# Patient Record
Sex: FEMALE | Race: WHITE | NOT HISPANIC OR LATINO | Employment: OTHER | URBAN - METROPOLITAN AREA
[De-identification: names, ages, dates, MRNs, and addresses within clinical notes are randomized per-mention and may not be internally consistent; named-entity substitution may affect disease eponyms.]

---

## 2018-08-31 RX ORDER — LEVOTHYROXINE SODIUM 112 UG/1
TABLET ORAL
Qty: 90 TABLET | Refills: 3 | Status: SHIPPED | OUTPATIENT
Start: 2018-08-31 | End: 2022-08-31

## 2020-05-21 ENCOUNTER — OFFICE VISIT (OUTPATIENT)
Dept: SURGERY | Facility: CLINIC | Age: 45
End: 2020-05-21
Payer: COMMERCIAL

## 2020-05-21 DIAGNOSIS — Z80.0 FAMILY HISTORY OF COLON CANCER: Primary | ICD-10-CM

## 2020-05-21 PROCEDURE — 99203 PR OFFICE/OUTPT VISIT, NEW, LEVL III, 30-44 MIN: ICD-10-PCS | Mod: 95,,, | Performed by: SURGERY

## 2020-05-21 PROCEDURE — 99203 OFFICE O/P NEW LOW 30 MIN: CPT | Mod: 95,,, | Performed by: SURGERY

## 2020-05-21 RX ORDER — SODIUM CHLORIDE 9 MG/ML
INJECTION, SOLUTION INTRAVENOUS CONTINUOUS
Status: CANCELLED | OUTPATIENT
Start: 2020-05-21

## 2020-05-21 RX ORDER — LIDOCAINE HYDROCHLORIDE 10 MG/ML
1 INJECTION, SOLUTION EPIDURAL; INFILTRATION; INTRACAUDAL; PERINEURAL ONCE
Status: DISCONTINUED | OUTPATIENT
Start: 2020-05-21 | End: 2020-06-29 | Stop reason: HOSPADM

## 2020-05-21 NOTE — H&P
"The patient location is: Home  The chief complaint leading to consultation is: I need a colonoscopy    Visit type: audiovisual    Face to Face time with patient: 30  30 minutes of total time spent on the encounter, which includes face to face time and non-face to face time preparing to see the patient (eg, review of tests), Obtaining and/or reviewing separately obtained history, Documenting clinical information in the electronic or other health record, Independently interpreting results (not separately reported) and communicating results to the patient/family/caregiver, or Care coordination (not separately reported).     Each patient to whom he or she provides medical services by telemedicine is:  (1) informed of the relationship between the physician and patient and the respective role of any other health care provider with respect to management of the patient; and (2) notified that he or she may decline to receive medical services by telemedicine and may withdraw from such care at any time.    Notes:       [facility] Surgery H&P Note    Subjective:       Patient ID: Kimberly Diane is a 45 y.o. female.    Chief Complaint: "I need a colonoscopy"    HPI:  Kimberly Diane is a 45 y.o. female history of thyroid disease status post thyroidectomy and currently on daily thyroid replacement presents today as a new patient referral for evaluation of a new family history of colon cancer.  Patient stated that her mother recently  of colon cancer at the age of 72.  It was well advanced at the time of diagnosis in within 2-3 months after diagnosis she succumbed to the colon cancer.  The physicians taking care of her mother notified the family that they would need to be screened for colon cancer given now either high risk nature.  Patient has had no blood in the stool.  No unexplained weight loss.  No bowel habit changes.  She presented to her primary care who recommended her here for evaluation possible " colonoscopy.    Past Medical History:   Diagnosis Date    Thyroid disease      Past Surgical History:   Procedure Laterality Date    CHOLECYSTECTOMY  2003    THYROIDECTOMY  2001     Family History   Problem Relation Age of Onset    Diabetes Mother     Cervical cancer Mother     Colon cancer Mother      Social History     Socioeconomic History    Marital status:      Spouse name: Not on file    Number of children: Not on file    Years of education: Not on file    Highest education level: Not on file   Occupational History    Not on file   Social Needs    Financial resource strain: Not on file    Food insecurity:     Worry: Not on file     Inability: Not on file    Transportation needs:     Medical: Not on file     Non-medical: Not on file   Tobacco Use    Smoking status: Never Smoker    Smokeless tobacco: Never Used   Substance and Sexual Activity    Alcohol use: Yes     Comment: occaisional    Drug use: No    Sexual activity: Not on file   Lifestyle    Physical activity:     Days per week: Not on file     Minutes per session: Not on file    Stress: Not on file   Relationships    Social connections:     Talks on phone: Not on file     Gets together: Not on file     Attends [employer] service: Not on file     Active member of club or organization: Not on file     Attends meetings of clubs or organizations: Not on file     Relationship status: Not on file   Other Topics Concern    Not on file   Social History Narrative    Not on file       Current Outpatient Medications   Medication Sig Dispense Refill    GILDESS FE 1/20, 28, 1 mg-20 mcg (21)/75 mg (7) per tablet Take 1 tablet by mouth once daily.  0    levothyroxine (SYNTHROID) 112 MCG tablet TAKE ONE TABLET BY MOUTH ONCE DAILY 90 tablet 3    levothyroxine (SYNTHROID) 125 MCG tablet Take 1 tablet by mouth once daily.  0    NORETHINDRONE-E.ESTRADIOL-IRON (GILDESS 24 FE ORAL) Take by mouth.       Current Facility-Administered  Medications   Medication Dose Route Frequency Provider Last Rate Last Dose    lidocaine (PF) 10 mg/ml (1%) injection 10 mg  1 mL Intradermal Once Jarad Sorensen MD         Review of patient's allergies indicates:  No Known Allergies    Review of Systems   Constitutional: Negative for activity change, appetite change, chills and fever.   HENT: Negative for congestion, dental problem and ear discharge.    Eyes: Negative for discharge and itching.   Respiratory: Negative for apnea, choking and chest tightness.    Cardiovascular: Negative for chest pain and leg swelling.   Gastrointestinal: Negative for abdominal distention, abdominal pain, anal bleeding, constipation, diarrhea and nausea.   Endocrine: Negative for cold intolerance and heat intolerance.   Genitourinary: Negative for difficulty urinating and dyspareunia.   Musculoskeletal: Negative for arthralgias and back pain.   Skin: Negative for color change and pallor.   Neurological: Negative for dizziness and facial asymmetry.   Hematological: Negative for adenopathy. Does not bruise/bleed easily.   Psychiatric/Behavioral: Negative for agitation and behavioral problems.       Objective:      There were no vitals filed for this visit.  Physical Exam   Constitutional: She is oriented to person, place, and time. She appears well-developed and well-nourished. No distress.   HENT:   Head: Normocephalic and atraumatic.   Eyes: Pupils are equal, round, and reactive to light. EOM are normal.   Neck: Normal range of motion. Neck supple. No thyromegaly present.   Cardiovascular: Normal rate and regular rhythm.   Pulmonary/Chest: Effort normal and breath sounds normal.   Abdominal: Soft. Bowel sounds are normal. She exhibits no distension. There is no tenderness.   Musculoskeletal: Normal range of motion. She exhibits no edema or deformity.   Neurological: She is alert and oriented to person, place, and time. No cranial nerve deficit.   Skin: Skin is warm. Capillary  refill takes less than 2 seconds. No erythema.   Psychiatric: She has a normal mood and affect. Her behavior is normal.        Assessment:       1. Family history of colon cancer        Plan:   Family history of colon cancer  -     CBC auto differential; Future; Expected date: 08/21/2020  -     Comprehensive metabolic panel; Future; Expected date: 08/21/2020  -     COVID-19 Routine Screening; Future; Expected date: 05/21/2020  -     Case Request Operating Room: COLONOSCOPY  -     Insert peripheral IV; Standing  -     Basic metabolic panel; Future; Expected date: 05/21/2020  -     CBC auto differential; Future; Expected date: 05/21/2020  -     Full code; Standing    Other orders  -     Progressive Mobility Protocol (mobilize patient to their highest level of functioning at least twice daily); Standing  -     lidocaine (PF) 10 mg/ml (1%) injection 10 mg        Medical Decision Making/Counseling:  Patient with family history of colon cancer.  Advanced at diagnosis.  No history of colonoscopy.  Patient now high risk for colon cancer given her family history.  Patient desires to proceed with colonoscopy.  Risk benefits colonoscopy were discussed in detail in clinic today.  After risk benefits discussion, patient voiced understanding risk benefits colonoscopy wished proceed the near future.    Will obtain preoperative lab test to include CBC, CMP and EKG for review by the Anesthesiologist the day of the procedure prior to induction of the anesthetic agent of choice.    Risks and benefits of endoscopy were discussed in depth in clinic.  From a procedural standpoint, we discuss the benefits of colonoscopy to be finding colon cancers at early stages, including polyps which can be endoscopically resectable, to finding early stage colon cancers which can be better treated with current medical and surgical therapies in order to give patients a longer survival, if found in these early stages.  From a standpoint of risks, the  risk of bleeding and perforation of the colon were discussed.  I personally discussed that if complications of bleeding or perforation were to occur, the patient could need as little as a blood transfusion and as much as possible hospital admission, repeat procedure, or even surgery. Total clinic time spent today 30 minutes with greater than half of the time spent in face to face counseling.    Patient instructed that best way to communicate with my office staff is for patient to get on the [facility] epic patient portal to expedite communication and communication issues that may occur.  Patient was given instructions on how to get on the portal.  I encouraged patient to obtain portal access as well.  Ultimately it is up to the patient to obtain access.  Patient voiced understanding.

## 2020-05-21 NOTE — LETTER
May 21, 2020      Rogelio Saba MD  [address] [place] [address]           [facility] - [facility]  [address] [place] [address]  Phone: [phone]  Fax: [phone]          Patient: Kimberly Diane   MR Number: [id number]   YOB: 1975   Date of Visit: 5/21/2020       Dear Dr. Rogelio Saba:    Thank you for referring Kimberly Diane to me for evaluation. Attached you will find relevant portions of my assessment and plan of care.    If you have questions, please do not hesitate to call me. I look forward to following Kimberly Diane along with you.    Sincerely,    Jarad Sorensen MD    Enclosure  CC:  No Recipients    If you would like to receive this communication electronically, please contact [email or web address] or [phone] to request more information on [employer] access.    For providers and/or their staff who would like to refer a patient to [facility], please contact us through our one-stop-shop provider referral line, [facility] , at [phone].    If you feel you have received this communication in error or would no longer like to receive these types of communications, please e-mail [email or web address]

## 2020-06-17 ENCOUNTER — LAB VISIT (OUTPATIENT)
Dept: LAB | Facility: HOSPITAL | Age: 45
End: 2020-06-17
Attending: SURGERY
Payer: COMMERCIAL

## 2020-06-17 DIAGNOSIS — Z80.0 FAMILY HISTORY OF COLON CANCER: ICD-10-CM

## 2020-06-17 LAB
ALBUMIN SERPL BCP-MCNC: 4.1 G/DL (ref 3.5–5.2)
ALP SERPL-CCNC: 63 U/L (ref 55–135)
ALT SERPL W/O P-5'-P-CCNC: 16 U/L (ref 10–44)
ANION GAP SERPL CALC-SCNC: 7 MMOL/L (ref 8–16)
AST SERPL-CCNC: 19 U/L (ref 10–40)
BASOPHILS # BLD AUTO: 0.04 K/UL (ref 0–0.2)
BASOPHILS NFR BLD: 0.6 % (ref 0–1.9)
BILIRUB SERPL-MCNC: 0.8 MG/DL (ref 0.1–1)
BUN SERPL-MCNC: 12 MG/DL (ref 6–20)
CALCIUM SERPL-MCNC: 9.2 MG/DL (ref 8.7–10.5)
CHLORIDE SERPL-SCNC: 105 MMOL/L (ref 95–110)
CHOLEST SERPL-MCNC: 162 MG/DL (ref 120–199)
CHOLEST/HDLC SERPL: 2.3 {RATIO} (ref 2–5)
CO2 SERPL-SCNC: 28 MMOL/L (ref 23–29)
CREAT SERPL-MCNC: 0.7 MG/DL (ref 0.5–1.4)
DIFFERENTIAL METHOD: NORMAL
EOSINOPHIL # BLD AUTO: 0.1 K/UL (ref 0–0.5)
EOSINOPHIL NFR BLD: 0.7 % (ref 0–8)
ERYTHROCYTE [DISTWIDTH] IN BLOOD BY AUTOMATED COUNT: 12.8 % (ref 11.5–14.5)
EST. GFR  (AFRICAN AMERICAN): >60 ML/MIN/1.73 M^2
EST. GFR  (NON AFRICAN AMERICAN): >60 ML/MIN/1.73 M^2
GLUCOSE SERPL-MCNC: 90 MG/DL (ref 70–110)
HCT VFR BLD AUTO: 40 % (ref 37–48.5)
HDLC SERPL-MCNC: 70 MG/DL (ref 40–75)
HDLC SERPL: 43.2 % (ref 20–50)
HGB BLD-MCNC: 12.8 G/DL (ref 12–16)
IMM GRANULOCYTES # BLD AUTO: 0.01 K/UL (ref 0–0.04)
IMM GRANULOCYTES NFR BLD AUTO: 0.1 % (ref 0–0.5)
LDLC SERPL CALC-MCNC: 79.2 MG/DL (ref 63–159)
LYMPHOCYTES # BLD AUTO: 2.2 K/UL (ref 1–4.8)
LYMPHOCYTES NFR BLD: 32.3 % (ref 18–48)
MCH RBC QN AUTO: 29.3 PG (ref 27–31)
MCHC RBC AUTO-ENTMCNC: 32 G/DL (ref 32–36)
MCV RBC AUTO: 92 FL (ref 82–98)
MONOCYTES # BLD AUTO: 0.5 K/UL (ref 0.3–1)
MONOCYTES NFR BLD: 7.7 % (ref 4–15)
NEUTROPHILS # BLD AUTO: 4.1 K/UL (ref 1.8–7.7)
NEUTROPHILS NFR BLD: 58.6 % (ref 38–73)
NONHDLC SERPL-MCNC: 92 MG/DL
NRBC BLD-RTO: 0 /100 WBC
PLATELET # BLD AUTO: 291 K/UL (ref 150–350)
PMV BLD AUTO: 9.6 FL (ref 9.2–12.9)
POTASSIUM SERPL-SCNC: 4.1 MMOL/L (ref 3.5–5.1)
PROT SERPL-MCNC: 7.1 G/DL (ref 6–8.4)
RBC # BLD AUTO: 4.37 M/UL (ref 4–5.4)
SODIUM SERPL-SCNC: 140 MMOL/L (ref 136–145)
T4 FREE SERPL-MCNC: 1.35 NG/DL (ref 0.71–1.51)
TRIGL SERPL-MCNC: 64 MG/DL (ref 30–150)
TSH SERPL DL<=0.005 MIU/L-ACNC: 0.04 UIU/ML (ref 0.34–5.6)
WBC # BLD AUTO: 6.91 K/UL (ref 3.9–12.7)

## 2020-06-17 PROCEDURE — 80061 LIPID PANEL: CPT

## 2020-06-17 PROCEDURE — 84443 ASSAY THYROID STIM HORMONE: CPT

## 2020-06-17 PROCEDURE — 80053 COMPREHEN METABOLIC PANEL: CPT

## 2020-06-17 PROCEDURE — 36415 COLL VENOUS BLD VENIPUNCTURE: CPT

## 2020-06-17 PROCEDURE — 85025 COMPLETE CBC W/AUTO DIFF WBC: CPT

## 2020-06-17 PROCEDURE — 84439 ASSAY OF FREE THYROXINE: CPT

## 2020-06-27 ENCOUNTER — LAB VISIT (OUTPATIENT)
Dept: FAMILY MEDICINE | Facility: CLINIC | Age: 45
End: 2020-06-27
Payer: COMMERCIAL

## 2020-06-27 DIAGNOSIS — Z80.0 FAMILY HISTORY OF COLON CANCER: ICD-10-CM

## 2020-06-27 PROCEDURE — U0003 INFECTIOUS AGENT DETECTION BY NUCLEIC ACID (DNA OR RNA); SEVERE ACUTE RESPIRATORY SYNDROME CORONAVIRUS 2 (SARS-COV-2) (CORONAVIRUS DISEASE [COVID-19]), AMPLIFIED PROBE TECHNIQUE, MAKING USE OF HIGH THROUGHPUT TECHNOLOGIES AS DESCRIBED BY CMS-2020-01-R: HCPCS

## 2020-06-28 LAB — SARS-COV-2 RNA RESP QL NAA+PROBE: NOT DETECTED

## 2020-06-29 ENCOUNTER — ANESTHESIA (OUTPATIENT)
Dept: SURGERY | Facility: HOSPITAL | Age: 45
End: 2020-06-29
Payer: COMMERCIAL

## 2020-06-29 ENCOUNTER — HOSPITAL ENCOUNTER (OUTPATIENT)
Facility: HOSPITAL | Age: 45
Discharge: HOME | End: 2020-06-29
Attending: SURGERY | Admitting: SURGERY
Payer: COMMERCIAL

## 2020-06-29 ENCOUNTER — ANESTHESIA EVENT (OUTPATIENT)
Dept: SURGERY | Facility: HOSPITAL | Age: 45
End: 2020-06-29
Payer: COMMERCIAL

## 2020-06-29 DIAGNOSIS — Z80.0 FAMILY HISTORY OF COLON CANCER: ICD-10-CM

## 2020-06-29 PROCEDURE — G0105 COLORECTAL SCRN; HI RISK IND: HCPCS | Mod: ,,, | Performed by: SURGERY

## 2020-06-29 PROCEDURE — 63600175 PHARM REV CODE 636 W HCPCS: Performed by: NURSE ANESTHETIST, CERTIFIED REGISTERED

## 2020-06-29 PROCEDURE — 25000003 PHARM REV CODE 250: Performed by: SURGERY

## 2020-06-29 PROCEDURE — D9220A PRA ANESTHESIA: Mod: CRNA,,, | Performed by: NURSE ANESTHETIST, CERTIFIED REGISTERED

## 2020-06-29 PROCEDURE — 25000003 PHARM REV CODE 250: Performed by: NURSE ANESTHETIST, CERTIFIED REGISTERED

## 2020-06-29 PROCEDURE — D9220A PRA ANESTHESIA: Mod: ANES,,, | Performed by: ANESTHESIOLOGY

## 2020-06-29 PROCEDURE — G0105 COLORECTAL SCRN; HI RISK IND: ICD-10-PCS | Mod: ,,, | Performed by: SURGERY

## 2020-06-29 PROCEDURE — G0105 COLORECTAL SCRN; HI RISK IND: HCPCS | Performed by: SURGERY

## 2020-06-29 PROCEDURE — D9220A PRA ANESTHESIA: ICD-10-PCS | Mod: CRNA,,, | Performed by: NURSE ANESTHETIST, CERTIFIED REGISTERED

## 2020-06-29 PROCEDURE — 45378 DIAGNOSTIC COLONOSCOPY: CPT | Performed by: SURGERY

## 2020-06-29 PROCEDURE — 37000009 HC ANESTHESIA EA ADD 15 MINS: Performed by: SURGERY

## 2020-06-29 PROCEDURE — 37000008 HC ANESTHESIA 1ST 15 MINUTES: Performed by: SURGERY

## 2020-06-29 PROCEDURE — D9220A PRA ANESTHESIA: ICD-10-PCS | Mod: ANES,,, | Performed by: ANESTHESIOLOGY

## 2020-06-29 RX ORDER — PROPOFOL 10 MG/ML
VIAL (ML) INTRAVENOUS
Status: DISCONTINUED | OUTPATIENT
Start: 2020-06-29 | End: 2020-06-29

## 2020-06-29 RX ORDER — GLYCOPYRROLATE 0.2 MG/ML
INJECTION INTRAMUSCULAR; INTRAVENOUS
Status: DISCONTINUED | OUTPATIENT
Start: 2020-06-29 | End: 2020-06-29

## 2020-06-29 RX ORDER — LIDOCAINE HYDROCHLORIDE 10 MG/ML
1 INJECTION, SOLUTION EPIDURAL; INFILTRATION; INTRACAUDAL; PERINEURAL ONCE
Status: CANCELLED | OUTPATIENT
Start: 2020-06-29 | End: 2020-06-29

## 2020-06-29 RX ORDER — SODIUM CHLORIDE, SODIUM LACTATE, POTASSIUM CHLORIDE, CALCIUM CHLORIDE 600; 310; 30; 20 MG/100ML; MG/100ML; MG/100ML; MG/100ML
INJECTION, SOLUTION INTRAVENOUS CONTINUOUS
Status: CANCELLED | OUTPATIENT
Start: 2020-06-29

## 2020-06-29 RX ORDER — ONDANSETRON 2 MG/ML
4 INJECTION INTRAMUSCULAR; INTRAVENOUS DAILY PRN
Status: DISCONTINUED | OUTPATIENT
Start: 2020-06-29 | End: 2020-06-29 | Stop reason: HOSPADM

## 2020-06-29 RX ORDER — FAMOTIDINE 10 MG/ML
20 INJECTION INTRAVENOUS ONCE
Status: CANCELLED | OUTPATIENT
Start: 2020-06-29 | End: 2020-06-29

## 2020-06-29 RX ORDER — SODIUM CHLORIDE 9 MG/ML
INJECTION, SOLUTION INTRAVENOUS CONTINUOUS
Status: DISCONTINUED | OUTPATIENT
Start: 2020-06-29 | End: 2020-06-29 | Stop reason: HOSPADM

## 2020-06-29 RX ORDER — DIPHENHYDRAMINE HYDROCHLORIDE 50 MG/ML
12.5 INJECTION INTRAMUSCULAR; INTRAVENOUS
Status: DISCONTINUED | OUTPATIENT
Start: 2020-06-29 | End: 2020-06-29 | Stop reason: HOSPADM

## 2020-06-29 RX ORDER — SODIUM CHLORIDE, SODIUM LACTATE, POTASSIUM CHLORIDE, CALCIUM CHLORIDE 600; 310; 30; 20 MG/100ML; MG/100ML; MG/100ML; MG/100ML
125 INJECTION, SOLUTION INTRAVENOUS CONTINUOUS
Status: DISCONTINUED | OUTPATIENT
Start: 2020-06-29 | End: 2020-06-29 | Stop reason: HOSPADM

## 2020-06-29 RX ADMIN — PROPOFOL 20 MG: 10 INJECTION, EMULSION INTRAVENOUS at 09:06

## 2020-06-29 RX ADMIN — PROPOFOL 30 MG: 10 INJECTION, EMULSION INTRAVENOUS at 09:06

## 2020-06-29 RX ADMIN — GLYCOPYRROLATE 0.4 MG: 0.2 INJECTION INTRAMUSCULAR; INTRAVENOUS at 09:06

## 2020-06-29 RX ADMIN — SODIUM CHLORIDE: 0.9 INJECTION, SOLUTION INTRAVENOUS at 08:06

## 2020-06-29 NOTE — H&P
"The patient location is: Home  The chief complaint leading to consultation is: I need a colonoscopy    Visit type: audiovisual    Face to Face time with patient: 30  30 minutes of total time spent on the encounter, which includes face to face time and non-face to face time preparing to see the patient (eg, review of tests), Obtaining and/or reviewing separately obtained history, Documenting clinical information in the electronic or other health record, Independently interpreting results (not separately reported) and communicating results to the patient/family/caregiver, or Care coordination (not separately reported).     Each patient to whom he or she provides medical services by telemedicine is:  (1) informed of the relationship between the physician and patient and the respective role of any other health care provider with respect to management of the patient; and (2) notified that he or she may decline to receive medical services by telemedicine and may withdraw from such care at any time.    Notes:       [facility] Surgery H&P Note    Subjective:       Patient ID: Kimberly Diane is a 45 y.o. female.    Chief Complaint: "I need a colonoscopy"    HPI:  Kimberly Diane is a 45 y.o. female history of thyroid disease status post thyroidectomy and currently on daily thyroid replacement presents today as a new patient referral for evaluation of a new family history of colon cancer.  Patient stated that her mother recently  of colon cancer at the age of 72.  It was well advanced at the time of diagnosis in within 2-3 months after diagnosis she succumbed to the colon cancer.  The physicians taking care of her mother notified the family that they would need to be screened for colon cancer given now either high risk nature.  Patient has had no blood in the stool.  No unexplained weight loss.  No bowel habit changes.  She presented to her primary care who recommended her here for evaluation possible " colonoscopy.    Past Medical History:   Diagnosis Date    Thyroid disease      Past Surgical History:   Procedure Laterality Date    CHOLECYSTECTOMY  2003    THYROIDECTOMY  2001     Family History   Problem Relation Age of Onset    Diabetes Mother     Cervical cancer Mother     Colon cancer Mother      Social History     Socioeconomic History    Marital status:      Spouse name: Not on file    Number of children: Not on file    Years of education: Not on file    Highest education level: Not on file   Occupational History    Not on file   Social Needs    Financial resource strain: Not on file    Food insecurity     Worry: Not on file     Inability: Not on file    Transportation needs     Medical: Not on file     Non-medical: Not on file   Tobacco Use    Smoking status: Never Smoker    Smokeless tobacco: Never Used   Substance and Sexual Activity    Alcohol use: Yes     Comment: occaisional    Drug use: No    Sexual activity: Not on file   Lifestyle    Physical activity     Days per week: Not on file     Minutes per session: Not on file    Stress: Not on file   Relationships    Social connections     Talks on phone: Not on file     Gets together: Not on file     Attends Caodaism service: Not on file     Active member of club or organization: Not on file     Attends meetings of clubs or organizations: Not on file     Relationship status: Not on file   Other Topics Concern    Not on file   Social History Narrative    Not on file       Current Facility-Administered Medications   Medication Dose Route Frequency Provider Last Rate Last Dose    0.9%  NaCl infusion   Intravenous Continuous Jarad Sorensen MD         Review of patient's allergies indicates:  No Known Allergies    Review of Systems   Constitutional: Negative for activity change, appetite change, chills and fever.   HENT: Negative for congestion, dental problem and ear discharge.    Eyes: Negative for discharge and  "itching.   Respiratory: Negative for apnea, choking and chest tightness.    Cardiovascular: Negative for chest pain and leg swelling.   Gastrointestinal: Negative for abdominal distention, abdominal pain, anal bleeding, constipation, diarrhea and nausea.   Endocrine: Negative for cold intolerance and heat intolerance.   Genitourinary: Negative for difficulty urinating and dyspareunia.   Musculoskeletal: Negative for arthralgias and back pain.   Skin: Negative for color change and pallor.   Neurological: Negative for dizziness and facial asymmetry.   Hematological: Negative for adenopathy. Does not bruise/bleed easily.   Psychiatric/Behavioral: Negative for agitation and behavioral problems.       Objective:      Vitals:    06/29/20 0818   BP: 114/68   BP Location: Right arm   Patient Position: Lying   Pulse: 66   Resp: 15   Temp: 98.1 °F (36.7 °C)   TempSrc: Oral   SpO2: 98%   Weight: 70.3 kg (155 lb)   Height: 5' 1" (1.549 m)     Physical Exam  Constitutional:       General: She is not in acute distress.     Appearance: She is well-developed.   HENT:      Head: Normocephalic and atraumatic.   Eyes:      Pupils: Pupils are equal, round, and reactive to light.   Neck:      Musculoskeletal: Normal range of motion and neck supple.      Thyroid: No thyromegaly.   Cardiovascular:      Rate and Rhythm: Normal rate and regular rhythm.   Pulmonary:      Effort: Pulmonary effort is normal.      Breath sounds: Normal breath sounds.   Abdominal:      General: Bowel sounds are normal. There is no distension.      Palpations: Abdomen is soft.      Tenderness: There is no abdominal tenderness.   Musculoskeletal: Normal range of motion.         General: No deformity.   Skin:     General: Skin is warm.      Capillary Refill: Capillary refill takes less than 2 seconds.      Findings: No erythema.   Neurological:      Mental Status: She is alert and oriented to person, place, and time.      Cranial Nerves: No cranial nerve deficit. "   Psychiatric:         Behavior: Behavior normal.          Assessment:       1. Family history of colon cancer        Plan:   Family history of colon cancer  -     Case Request Operating Room: COLONOSCOPY  -     Place in Outpatient; Standing  -     Diet NPO; Standing  -     Notify Physician; Standing  -     Vital signs; Standing  -     Place sequential compression device; Standing  -     Place DIANA hose; Standing    Other orders  -     0.9%  NaCl infusion  -     Notify Anesthesiologist if Patient on Home Insulin Pump; Standing  -     POCT glucose; Standing        Medical Decision Making/Counseling:  Patient with family history of colon cancer.  Advanced at diagnosis.  No history of colonoscopy.  Patient now high risk for colon cancer given her family history.  Patient desires to proceed with colonoscopy.  Risk benefits colonoscopy were discussed in detail in clinic today.  After risk benefits discussion, patient voiced understanding risk benefits colonoscopy wished proceed the near future.    Will obtain preoperative lab test to include CBC, CMP and EKG for review by the Anesthesiologist the day of the procedure prior to induction of the anesthetic agent of choice.    Risks and benefits of endoscopy were discussed in depth in clinic.  From a procedural standpoint, we discuss the benefits of colonoscopy to be finding colon cancers at early stages, including polyps which can be endoscopically resectable, to finding early stage colon cancers which can be better treated with current medical and surgical therapies in order to give patients a longer survival, if found in these early stages.  From a standpoint of risks, the risk of bleeding and perforation of the colon were discussed.  I personally discussed that if complications of bleeding or perforation were to occur, the patient could need as little as a blood transfusion and as much as possible hospital admission, repeat procedure, or even surgery. Total clinic time  spent today 30 minutes with greater than half of the time spent in face to face counseling.    Patient instructed that best way to communicate with my office staff is for patient to get on the [facility] patient portal to expedite communication and communication issues that may occur.  Patient was given instructions on how to get on the portal.  I encouraged patient to obtain portal access as well.  Ultimately it is up to the patient to obtain access.  Patient voiced understanding.

## 2020-06-29 NOTE — ANESTHESIA PREPROCEDURE EVALUATION
06/29/2020  Kimberly Diane is a 45 y.o., female.    Anesthesia Evaluation    I have reviewed the Patient Summary Reports.    I have reviewed the Nursing Notes. I have reviewed the NPO Status.   I have reviewed the Medications.     Review of Systems  Social:  Non-Smoker, Social Alcohol Use    Hematology/Oncology:  Hematology Normal   Oncology Normal     EENT/Dental:EENT/Dental Normal   Cardiovascular:  Cardiovascular Normal     Pulmonary:  Pulmonary Normal    Renal/:  Renal/ Normal     Musculoskeletal:  Musculoskeletal Normal    Neurological:  Neurology Normal    Endocrine:  Endocrine Normal    Dermatological:  Skin Normal    Psych:  Psychiatric Normal           Physical Exam  General:  Well nourished    Airway/Jaw/Neck:  Airway Findings: Mouth Opening: Normal Tongue: Normal  General Airway Assessment: Adult  Mallampati: II  TM Distance: Normal, at least 6 cm       Chest/Lungs:  Chest/Lungs Findings: Clear to auscultation     Heart/Vascular:  Heart Findings: Rate: Normal  Rhythm: Regular Rhythm        Mental Status:  Mental Status Findings:  Cooperative, Alert and Oriented         Anesthesia Plan  Type of Anesthesia, risks & benefits discussed:  Anesthesia Type:  general  Patient's Preference:   Intra-op Monitoring Plan: standard ASA monitors  Intra-op Monitoring Plan Comments:   Post Op Pain Control Plan: IV/PO Opioids PRN  Post Op Pain Control Plan Comments:   Induction:   IV  Beta Blocker:  Patient is not currently on a Beta-Blocker (No further documentation required).       Informed Consent: Patient understands risks and agrees with Anesthesia plan.  Questions answered. Anesthesia consent signed with patient.  ASA Score: 2     Day of Surgery Review of History & Physical: I have interviewed and examined the patient. I have reviewed the patient's H&P dated:            Ready For Surgery From  Anesthesia Perspective.

## 2020-06-29 NOTE — ANESTHESIA POSTPROCEDURE EVALUATION
Anesthesia Post Evaluation    Patient: Kimberly Cordova [place]    Procedure(s) Performed: Procedure(s) (LRB):  COLONOSCOPY (N/A)    Final Anesthesia Type: general    Patient location during evaluation: PACU  Patient participation: Yes- Able to Participate  Level of consciousness: awake and alert  Post-procedure vital signs: reviewed and stable  Pain management: adequate  Airway patency: patent    PONV status at discharge: No PONV  Anesthetic complications: no      Cardiovascular status: blood pressure returned to baseline  Respiratory status: unassisted  Hydration status: euvolemic  Follow-up not needed.          Vitals Value Taken Time   /85 06/29/20 1001   Temp 36.4 °C (97.6 °F) 06/29/20 0930   Pulse 98 06/29/20 1027   Resp 16 06/29/20 1011   SpO2 100 % 06/29/20 1027         Event Time   Out of Recovery 09:58:23         Pain/Romi Score: Romi Score: 9 (6/29/2020  9:45 AM)  Modified Romi Score: 20 (6/29/2020 10:30 AM)

## 2020-06-29 NOTE — PLAN OF CARE
"Attempting to wake pt up and drink PO fluids. States, "I'm just so tired. I need to nap." States, "My stomach is cramping." Advised pt this is gas pains causing the cramping and the air needs to come out/pass flatus. States, "I don't do that." Laying on left side with knees drawn up. No outward signs of distress.  "

## 2020-06-29 NOTE — PLAN OF CARE
Ride is here to pickup patient. Pulse ox removed. Has met unit/department guidelines for discharge from each phase of the post procedure continuum. Leaving floor per w/c with RN. EVELYN x3. Resp even and unlabored room air. No distress noted. All personal belongings returned to pt. DC instructions explained to pt and sister. V/u. All questions answered.

## 2020-06-29 NOTE — TRANSFER OF CARE
"Anesthesia Transfer of Care Note    Patient: Kimberly Cordova Diane    Procedure(s) Performed: Procedure(s) (LRB):  COLONOSCOPY (N/A)    Patient location: PACU    Anesthesia Type: general    Transport from OR: Transported from OR on room air with adequate spontaneous ventilation    Post pain: adequate analgesia    Post assessment: no apparent anesthetic complications and tolerated procedure well    Post vital signs: stable    Level of consciousness: sedated and responds to stimulation    Nausea/Vomiting: no nausea/vomiting    Complications: none    Transfer of care protocol was followed      Last vitals:   Visit Vitals  /68 (BP Location: Right arm, Patient Position: Lying)   Pulse 66   Temp 36.7 °C (98.1 °F) (Oral)   Resp 15   Ht 5' 1" (1.549 m)   Wt 70.3 kg (155 lb)   SpO2 98%   Breastfeeding No   BMI 29.29 kg/m²     "

## 2020-06-29 NOTE — [employer] PATIENT INSTRUCTIONS
Discharge Summary/Instructions after an Endoscopic Procedure  Patient Name: Kimberly Diane  Patient MRN: [id number]  Patient YOB: 1975 Monday, June 29, 2020  Jarad Sorensen MD  RESTRICTIONS:  During your procedure today, you received medications for sedation.  These   medications may affect your judgment, balance and coordination.  Therefore,   for 24 hours, you have the following restrictions:   - DO NOT drive a car, operate machinery, make legal/financial decisions,   sign important papers or drink alcohol.    ACTIVITY:  Today: no heavy lifting, straining or running due to procedural   sedation/anesthesia.  The following day: return to full activity including work.  DIET:  Eat and drink normally unless instructed otherwise.     TREATMENT FOR COMMON SIDE EFFECTS:  - Mild abdominal pain, nausea, belching, bloating or excessive gas:  rest,   eat lightly and use a heating pad.  - Sore Throat: treat with throat lozenges and/or gargle with warm salt   water.  - Because air was used during the procedure, expelling large amounts of air   from your rectum or belching is normal.  - If a bowel prep was taken, you may not have a bowel movement for 1-3 days.    This is normal.  SYMPTOMS TO WATCH FOR AND REPORT TO YOUR PHYSICIAN:  1. Abdominal pain or bloating, other than gas cramps.  2. Chest pain.  3. Back pain.  4. Signs of infection such as: chills or fever occurring within 24 hours   after the procedure.  5. Rectal bleeding, which would show as bright red, maroon, or black stools.   (A tablespoon of blood from the rectum is not serious, especially if   hemorrhoids are present.)  6. Vomiting.  7. Weakness or dizziness.  GO DIRECTLY TO THE NEAREST EMERGENCY ROOM IF YOU HAVE ANY OF THE FOLLOWING:      Difficulty breathing              Chills and/or fever over 101 F   Persistent vomiting and/or vomiting blood   Severe abdominal pain   Severe chest pain   Black, tarry stools   Bleeding- more than one  tablespoon   Any other symptom or condition that you feel may need urgent attention  Your doctor recommends these additional instructions:  If any biopsies were taken, your doctors clinic will contact you in 1 to 2   weeks with any results.  - Discharge patient to home (ambulatory).   - Resume previous diet.   - Continue present medications.   - Repeat colonoscopy in 5 years for surveillance.   - Return to my office PRN.  For questions, problems or results please call your physician - Jarad Sorensen MD at Work:  [phone].  [facility] EMERGENCY ROOM PHONE NUMBER: [phone]  IF A COMPLICATION OR EMERGENCY SITUATION ARISES AND YOU ARE UNABLE TO REACH   YOUR PHYSICIAN - GO DIRECTLY TO THE EMERGENCY ROOM.  MD Jarad Douglas MD  6/29/2020 9:38:25 AM  This report has been verified and signed electronically.  [employer]

## 2020-06-29 NOTE — PLAN OF CARE
"Up to w/c. Reports voiding without difficulty. Pulse ox applied to continue pt monitoring. Offered PO fluids. "I'm okay i'm just cold." Warm blanket provided.  "

## 2020-06-29 NOTE — PLAN OF CARE
"Awake and alert. States, "I have to pee." PACU monitors removed. Personal items placed in bathroom. Ambulated to bathroom. Steady gait observed. Instructed to pull call light cord if she needs assistance. V/u.  "

## 2020-07-01 VITALS
SYSTOLIC BLOOD PRESSURE: 126 MMHG | HEIGHT: 61 IN | TEMPERATURE: 98 F | RESPIRATION RATE: 18 BRPM | BODY MASS INDEX: 29.27 KG/M2 | DIASTOLIC BLOOD PRESSURE: 85 MMHG | OXYGEN SATURATION: 100 % | HEART RATE: 98 BPM | WEIGHT: 155 LBS

## 2020-07-12 ENCOUNTER — NURSE TRIAGE (OUTPATIENT)
Dept: ADMINISTRATIVE | Facility: CLINIC | Age: 45
End: 2020-07-12

## 2020-11-06 ENCOUNTER — LAB VISIT (OUTPATIENT)
Dept: LAB | Facility: HOSPITAL | Age: 45
End: 2020-11-06
Attending: NURSE PRACTITIONER
Payer: COMMERCIAL

## 2020-11-06 DIAGNOSIS — E89.0 POSTSURGICAL HYPOTHYROIDISM: Primary | ICD-10-CM

## 2020-11-06 LAB
T4 FREE SERPL-MCNC: 1.36 NG/DL (ref 0.71–1.51)
TSH SERPL DL<=0.005 MIU/L-ACNC: 0.09 UIU/ML (ref 0.34–5.6)

## 2020-11-06 PROCEDURE — 84439 ASSAY OF FREE THYROXINE: CPT

## 2020-11-06 PROCEDURE — 36415 COLL VENOUS BLD VENIPUNCTURE: CPT

## 2020-11-06 PROCEDURE — 84443 ASSAY THYROID STIM HORMONE: CPT

## 2021-07-14 ENCOUNTER — LAB VISIT (OUTPATIENT)
Dept: LAB | Facility: HOSPITAL | Age: 46
End: 2021-07-14
Attending: NURSE PRACTITIONER
Payer: COMMERCIAL

## 2021-07-14 DIAGNOSIS — E89.0 POSTSURGICAL HYPOTHYROIDISM: Primary | ICD-10-CM

## 2021-07-14 LAB
T4 FREE SERPL-MCNC: 1.05 NG/DL (ref 0.71–1.51)
TSH SERPL DL<=0.005 MIU/L-ACNC: 7.32 UIU/ML (ref 0.4–4)

## 2021-07-14 PROCEDURE — 36415 COLL VENOUS BLD VENIPUNCTURE: CPT | Performed by: NURSE PRACTITIONER

## 2021-07-14 PROCEDURE — 84443 ASSAY THYROID STIM HORMONE: CPT | Performed by: NURSE PRACTITIONER

## 2021-07-14 PROCEDURE — 84439 ASSAY OF FREE THYROXINE: CPT | Performed by: NURSE PRACTITIONER

## 2021-09-28 ENCOUNTER — LAB VISIT (OUTPATIENT)
Dept: LAB | Facility: HOSPITAL | Age: 46
End: 2021-09-28
Attending: NURSE PRACTITIONER
Payer: COMMERCIAL

## 2021-09-28 DIAGNOSIS — E89.0 POSTSURGICAL HYPOTHYROIDISM: Primary | ICD-10-CM

## 2021-09-28 LAB
T4 FREE SERPL-MCNC: 1.24 NG/DL (ref 0.71–1.51)
TSH SERPL DL<=0.005 MIU/L-ACNC: 0.3 UIU/ML (ref 0.4–4)

## 2021-09-28 PROCEDURE — 36415 COLL VENOUS BLD VENIPUNCTURE: CPT | Performed by: NURSE PRACTITIONER

## 2021-09-28 PROCEDURE — 84443 ASSAY THYROID STIM HORMONE: CPT | Performed by: NURSE PRACTITIONER

## 2021-09-28 PROCEDURE — 84439 ASSAY OF FREE THYROXINE: CPT | Performed by: NURSE PRACTITIONER

## 2022-03-18 ENCOUNTER — LAB VISIT (OUTPATIENT)
Dept: LAB | Facility: HOSPITAL | Age: 47
End: 2022-03-18
Attending: NURSE PRACTITIONER
Payer: COMMERCIAL

## 2022-03-18 DIAGNOSIS — E89.0 POSTSURGICAL HYPOTHYROIDISM: Primary | ICD-10-CM

## 2022-03-18 LAB
T4 FREE SERPL-MCNC: 1.12 NG/DL (ref 0.71–1.51)
TSH SERPL DL<=0.005 MIU/L-ACNC: 0.72 UIU/ML (ref 0.4–4)

## 2022-03-18 PROCEDURE — 36415 COLL VENOUS BLD VENIPUNCTURE: CPT | Performed by: NURSE PRACTITIONER

## 2022-03-18 PROCEDURE — 84439 ASSAY OF FREE THYROXINE: CPT | Performed by: NURSE PRACTITIONER

## 2022-03-18 PROCEDURE — 84443 ASSAY THYROID STIM HORMONE: CPT | Performed by: NURSE PRACTITIONER

## 2022-08-31 ENCOUNTER — HOSPITAL ENCOUNTER (OUTPATIENT)
Dept: RADIOLOGY | Facility: HOSPITAL | Age: 47
Discharge: HOME | End: 2022-08-31
Attending: PODIATRIST

## 2022-08-31 ENCOUNTER — OFFICE VISIT (OUTPATIENT)
Dept: PODIATRY | Facility: CLINIC | Age: 47
End: 2022-08-31

## 2022-08-31 VITALS
BODY MASS INDEX: 29.27 KG/M2 | HEIGHT: 61 IN | SYSTOLIC BLOOD PRESSURE: 111 MMHG | WEIGHT: 155 LBS | DIASTOLIC BLOOD PRESSURE: 78 MMHG | HEART RATE: 77 BPM | TEMPERATURE: 98 F

## 2022-08-31 DIAGNOSIS — D36.10 NEUROMA: Primary | ICD-10-CM

## 2022-08-31 DIAGNOSIS — D36.10 NEUROMA: ICD-10-CM

## 2022-08-31 PROCEDURE — 99999 PR PBB SHADOW E&M-EST. PATIENT-LVL IV: ICD-10-PCS | Mod: PBBFAC,,, | Performed by: PODIATRIST

## 2022-08-31 PROCEDURE — 73630 X-RAY EXAM OF FOOT: CPT | Mod: 26,RT,, | Performed by: RADIOLOGY

## 2022-08-31 PROCEDURE — 99999 PR PBB SHADOW E&M-EST. PATIENT-LVL IV: CPT | Mod: PBBFAC,,, | Performed by: PODIATRIST

## 2022-08-31 PROCEDURE — 99203 OFFICE O/P NEW LOW 30 MIN: CPT | Mod: ,,, | Performed by: PODIATRIST

## 2022-08-31 PROCEDURE — 73630 X-RAY EXAM OF FOOT: CPT | Mod: TC,RT

## 2022-08-31 PROCEDURE — 73630 XR FOOT COMPLETE 3 VIEW RIGHT: ICD-10-PCS | Mod: 26,RT,, | Performed by: RADIOLOGY

## 2022-08-31 PROCEDURE — 99203 PR OFFICE/OUTPT VISIT, NEW, LEVL III, 30-44 MIN: ICD-10-PCS | Mod: ,,, | Performed by: PODIATRIST

## 2022-08-31 RX ORDER — DICLOFENAC SODIUM 75 MG/1
75 TABLET, DELAYED RELEASE ORAL 2 TIMES DAILY
Qty: 60 TABLET | Refills: 0 | Status: SHIPPED | OUTPATIENT
Start: 2022-08-31 | End: 2022-09-27

## 2022-08-31 RX ORDER — LEVOTHYROXINE SODIUM 100 UG/1
100 TABLET ORAL DAILY
COMMUNITY
Start: 2022-07-17

## 2022-09-04 NOTE — PROGRESS NOTES
Subjective:       Patient ID: Kimberly Diane is a 47 y.o. female.    Chief Complaint: Foot Problem and Foot Pain  Patient presents today she is complaining about painful digits 2 3 and 4 on the right foot.  Patient states the 2nd and 3rd digits have started to spread out she relates that this really started around the beginning of July approximately July 5th when she started walking 3-1/2 miles a day she states the pain was occasional now is almost all of the time and she experiences a pulling sensation when she goes down the stairs.  Patient relates swelling on the ball of her right foot.    Past Medical History:   Diagnosis Date    Thyroid disease      Past Surgical History:   Procedure Laterality Date    CHOLECYSTECTOMY  2003    COLONOSCOPY N/A 6/29/2020    Procedure: COLONOSCOPY;  Surgeon: Jarad Sorensen MD;  Location: [facility];  Service: General;  Laterality: N/A;    THYROIDECTOMY  2001     Family History   Problem Relation Age of Onset    Diabetes Mother     Cervical cancer Mother     Colon cancer Mother      Social History     Socioeconomic History    Marital status:    Tobacco Use    Smoking status: Never    Smokeless tobacco: Never   Substance and Sexual Activity    Alcohol use: Yes     Comment: occaisional    Drug use: No    Sexual activity: Yes     Birth control/protection: OCP       Current Outpatient Medications   Medication Sig Dispense Refill    SYNTHROID 100 mcg tablet Take 100 mcg by mouth once daily.      diclofenac (VOLTAREN) 75 MG EC tablet Take 1 tablet (75 mg total) by mouth 2 (two) times daily. 60 tablet 0     No current facility-administered medications for this visit.     Review of patient's allergies indicates:  No Known Allergies    Review of Systems   Musculoskeletal:  Positive for arthralgias.   All other systems reviewed and are negative.    Objective:      Vitals:    08/31/22 1111   BP: 111/78   Pulse: 77   Temp: 97.9 °F (36.6 °C)   Weight: 70.3 kg (155 lb)  "  Height: 5' 1" (1.549 m)     Physical Exam  Vitals and nursing note reviewed.   Constitutional:       Appearance: Normal appearance.   Cardiovascular:      Pulses:           Dorsalis pedis pulses are 2+ on the right side and 2+ on the left side.        Posterior tibial pulses are 2+ on the right side and 2+ on the left side.   Pulmonary:      Effort: Pulmonary effort is normal.   Musculoskeletal:         General: Swelling, tenderness and deformity present.      Right foot: Deformity present.        Feet:    Feet:      Right foot:      Protective Sensation: 2 sites tested.  2 sites sensed.      Skin integrity: Erythema and warmth present.      Left foot:      Protective Sensation: 2 sites tested.  2 sites sensed.      Skin integrity: Skin integrity normal.   Skin:     General: Skin is warm.      Capillary Refill: Capillary refill takes 2 to 3 seconds.   Neurological:      General: No focal deficit present.      Mental Status: She is alert.   Psychiatric:         Mood and Affect: Mood normal.         Behavior: Behavior normal.         Thought Content: Thought content normal.                  Assessment:       1. Neuroma          Plan:        Patient presents today she is complaining about painful digits 2 3 and 4 on the right foot.  Patient states the 2nd and 3rd digits have started to spread out she relates that this really started around the beginning of July approximately July 5th when she started walking 3-1/2 miles a day she states the pain was occasional now is almost all of the time and she experiences a pulling sensation when she goes down the stairs.  Patient relates swelling on the ball of her right foot.  On evaluation patient has considerable inflammation on the plantar forefoot right especially underlying the 2nd and 3rd webspace right.  Patient has a well-defined area of discomfort upon palpation of the 2nd webspace right these findings are consistent with neuroma x-rays were taken today patient does " have significant narrowing between the 2nd and 3rd and 3rd and 4th metatarsals these findings are consistent with neuroma of the right foot.  Patient has elevated arches I have advised her not having enough support is putting too much pressure on the ball of her foot that as well as the pre disposed narrowing of the metatarsals is a significant contributing factor to the neuroma development I have explained to the patient it is very likely she has the narrowing of the metatarsals also on the left foot.  Patient does have some back issues additionally but I advised her this is typically a congenital deformity in conjunction with activity and other determining factors that can cause this to flare up.  X-rays reviewed in detail today I have advised the patient there are multiple conservative things that we can do to address this she needs to discontinue her flat sandals make sure that she is wearing something with good support and cushion I dispensed the patient some power step full length arch supports I have added additional arch padding I will consider a metatarsal pad as necessary when I follow up with the patient in several weeks.  I have recommended Oofos flip-flops or sandals as an option for something that she can wear as opposed to a closed in shoe she definitely needs to have good supportive footwear on when she house.  Patient will wear the power steps at all times of weight-bearing if she has any problems questions or concerns prior to her scheduled follow-up in several weeks she is to contact us immediately.  Patient will take diclofenac as directed.This note was created using [employer] voice recognition software that occasionally misinterpreted phrases or words.

## 2022-09-14 ENCOUNTER — OFFICE VISIT (OUTPATIENT)
Dept: PODIATRY | Facility: CLINIC | Age: 47
End: 2022-09-14

## 2022-09-14 VITALS
HEART RATE: 78 BPM | WEIGHT: 155 LBS | SYSTOLIC BLOOD PRESSURE: 105 MMHG | HEIGHT: 61 IN | BODY MASS INDEX: 29.27 KG/M2 | TEMPERATURE: 98 F | DIASTOLIC BLOOD PRESSURE: 72 MMHG

## 2022-09-14 DIAGNOSIS — D36.10 NEUROMA: Primary | ICD-10-CM

## 2022-09-14 PROCEDURE — 99999 PR PBB SHADOW E&M-EST. PATIENT-LVL III: ICD-10-PCS | Mod: PBBFAC,,, | Performed by: PODIATRIST

## 2022-09-14 PROCEDURE — 99213 OFFICE O/P EST LOW 20 MIN: CPT | Mod: ,,, | Performed by: PODIATRIST

## 2022-09-14 PROCEDURE — 99213 PR OFFICE/OUTPT VISIT, EST, LEVL III, 20-29 MIN: ICD-10-PCS | Mod: ,,, | Performed by: PODIATRIST

## 2022-09-14 PROCEDURE — 99999 PR PBB SHADOW E&M-EST. PATIENT-LVL III: CPT | Mod: PBBFAC,,, | Performed by: PODIATRIST

## 2022-09-17 NOTE — PROGRESS NOTES
"Subjective:       Patient ID: Kimberly Diane is a 47 y.o. female.    Chief Complaint: Follow-up, Foot Problem, and Foot Pain      Past Medical History:   Diagnosis Date    Thyroid disease      Past Surgical History:   Procedure Laterality Date    CHOLECYSTECTOMY  2003    COLONOSCOPY N/A 6/29/2020    Procedure: COLONOSCOPY;  Surgeon: Jarad Sorensen MD;  Location: [facility];  Service: General;  Laterality: N/A;    THYROIDECTOMY  2001     Family History   Problem Relation Age of Onset    Diabetes Mother     Cervical cancer Mother     Colon cancer Mother      Social History     Socioeconomic History    Marital status:    Tobacco Use    Smoking status: Never    Smokeless tobacco: Never   Substance and Sexual Activity    Alcohol use: Yes     Comment: occaisional    Drug use: No    Sexual activity: Yes     Birth control/protection: OCP       Current Outpatient Medications   Medication Sig Dispense Refill    diclofenac (VOLTAREN) 75 MG EC tablet Take 1 tablet (75 mg total) by mouth 2 (two) times daily. 60 tablet 0    SYNTHROID 100 mcg tablet Take 100 mcg by mouth once daily.       No current facility-administered medications for this visit.     Review of patient's allergies indicates:  No Known Allergies    Review of Systems   Musculoskeletal:  Positive for arthralgias.   All other systems reviewed and are negative.    Objective:      Vitals:    09/14/22 0851   BP: 105/72   Pulse: 78   Temp: 98.1 °F (36.7 °C)   Weight: 70.3 kg (155 lb)   Height: 5' 1" (1.549 m)     Physical Exam  Vitals and nursing note reviewed.   Constitutional:       Appearance: Normal appearance.   Cardiovascular:      Pulses:           Dorsalis pedis pulses are 2+ on the right side and 2+ on the left side.        Posterior tibial pulses are 2+ on the right side and 2+ on the left side.   Pulmonary:      Effort: Pulmonary effort is normal.   Musculoskeletal:         General: Swelling, tenderness and deformity present.      Right foot: " Deformity present.        Feet:    Feet:      Right foot:      Protective Sensation: 2 sites tested.  2 sites sensed.      Skin integrity: Erythema and warmth present.      Left foot:      Protective Sensation: 2 sites tested.  2 sites sensed.      Skin integrity: Skin integrity normal.   Skin:     General: Skin is warm.      Capillary Refill: Capillary refill takes 2 to 3 seconds.   Neurological:      General: No focal deficit present.      Mental Status: She is alert.   Psychiatric:         Mood and Affect: Mood normal.         Behavior: Behavior normal.         Thought Content: Thought content normal.                        Assessment:       1. Neuroma          Plan:        Patient presents today for follow-up neuroma right foot.  Patient states it is doing a lot better she states it only really bothers her right now after she does her 3 mi walk.  Patient did not tolerate the power step full length arch supports she states that they were too high and she had to stop wearing them.  Patient is taking the diclofenac as directed she has made changes in shoe gear clearly the diclofenac as help the patient but I did advised her she still has inflammation and tenderness around the 2nd and 3rd webspace on the right foot with signs of continued neuroma I did add a small blue metatarsal pad to the right only I want the patient to use this I have given her extra pads for her to put in her other shoes I want her to continue to take the diclofenac for a period of time and advised the patient if she is not doing better or should her conditions worsen she is to contact us immediately because the neuroma will get worse and become more problematic.  Follow-up will be as needed depending upon how the patient is doing.  This note was created using [employer] voice recognition software that occasionally misinterpreted phrases or words.

## 2025-05-15 ENCOUNTER — OFFICE VISIT (OUTPATIENT)
Dept: OTOLARYNGOLOGY | Facility: CLINIC | Age: 50
End: 2025-05-15

## 2025-05-15 VITALS — HEIGHT: 61 IN | BODY MASS INDEX: 29.27 KG/M2 | WEIGHT: 155 LBS

## 2025-05-15 DIAGNOSIS — J32.9 CHRONIC SINUSITIS, UNSPECIFIED LOCATION: Primary | ICD-10-CM

## 2025-05-15 PROCEDURE — 99999 PR PBB SHADOW E&M-EST. PATIENT-LVL III: CPT | Mod: PBBFAC,,, | Performed by: OTOLARYNGOLOGY

## 2025-05-15 PROCEDURE — 99204 OFFICE O/P NEW MOD 45 MIN: CPT | Mod: S$PBB,,, | Performed by: OTOLARYNGOLOGY

## 2025-05-15 PROCEDURE — 99213 OFFICE O/P EST LOW 20 MIN: CPT | Mod: PBBFAC,PN | Performed by: OTOLARYNGOLOGY

## 2025-05-15 PROCEDURE — 87070 CULTURE OTHR SPECIMN AEROBIC: CPT | Performed by: OTOLARYNGOLOGY

## 2025-05-15 RX ORDER — AMOXICILLIN AND CLAVULANATE POTASSIUM 875; 125 MG/1; MG/1
1 TABLET, FILM COATED ORAL 2 TIMES DAILY
Qty: 28 TABLET | Refills: 0 | Status: SHIPPED | OUTPATIENT
Start: 2025-05-15 | End: 2025-05-29

## 2025-05-15 NOTE — PROGRESS NOTES
"Subjective:       Patient ID: Kimberly Diane is a 50 y.o. female.    Chief Complaint: Sinusitis (Patient here with c/o " tired of being congested. I did afrin, sinus congestion, I went to the gym and about . I ran fever  and Monday. ")      This patient has has a couple of months of left-sided paranasal sinus pain and pressure significant nasal airway obstruction on the left she had some fever over the weekend which was four days ago but that is not been the case throughout this and she has mentioned notable airway obstruction on the left.  Some of the she initially attributed to [place]          Objective:      ENT Physical Exam    So her nasal exam shows congestion and evidence of infection on the left side with some mucopurulent material between the septum and the middle turbinate and inferior turbinate.  I obtained a culture.  Her right side looks much more normal that is no inflammation and that is more room.  Her oropharynx is normal    Her neck is remarkable for a cm lymph node that she brings to my attention in the right posterior triangle that is nontender it is freely mobile and is of uncertain origin small enough and isolated so I think the best advice for now is to observe        Assessment:       1. Chronic sinusitis, unspecified location         Plan:          I will contact him next week when the culture comes back I sent in two weeks of Augmentin to get things started we will discuss extending the Augmentin if appropriate either if the culture does not grow anything or if it shows that that is an appropriate choice and I wanted to get back together and a month to examine her see how her symptoms are doing and consider a sinus x-ray in follow up.       "

## 2025-05-17 LAB — BACTERIA SPEC AEROBE CULT: NORMAL

## 2025-05-20 ENCOUNTER — RESULTS FOLLOW-UP (OUTPATIENT)
Dept: OTOLARYNGOLOGY | Facility: CLINIC | Age: 50
End: 2025-05-20

## 2025-05-20 RX ORDER — AMOXICILLIN AND CLAVULANATE POTASSIUM 875; 125 MG/1; MG/1
1 TABLET, FILM COATED ORAL 2 TIMES DAILY
Qty: 28 TABLET | Refills: 0 | Status: SHIPPED | OUTPATIENT
Start: 2025-05-20 | End: 2025-06-03

## (undated) DEVICE — KIT DEFENDO VLV  AIR WATER SUC

## (undated) DEVICE — SOL WATER STRL IRR 1000ML

## (undated) DEVICE — GLOVE PI ULTRA TOUCH G SURGEON

## (undated) DEVICE — SYR SLIP TIP 5CC

## (undated) DEVICE — KIT ENDOKIT COMPLIANCE CUSTOM

## (undated) DEVICE — SEE MEDLINE ITEM 157116

## (undated) DEVICE — CANISTER SUCTION 3000CC